# Patient Record
Sex: MALE | Race: WHITE | URBAN - METROPOLITAN AREA
[De-identification: names, ages, dates, MRNs, and addresses within clinical notes are randomized per-mention and may not be internally consistent; named-entity substitution may affect disease eponyms.]

---

## 2023-02-21 ENCOUNTER — WEB ENCOUNTER (OUTPATIENT)
Dept: URBAN - METROPOLITAN AREA CLINIC 54 | Facility: CLINIC | Age: 48
End: 2023-02-21

## 2023-03-07 ENCOUNTER — LAB OUTSIDE AN ENCOUNTER (OUTPATIENT)
Dept: URBAN - METROPOLITAN AREA CLINIC 54 | Facility: CLINIC | Age: 48
End: 2023-03-07

## 2023-03-07 ENCOUNTER — OFFICE VISIT (OUTPATIENT)
Dept: URBAN - METROPOLITAN AREA CLINIC 54 | Facility: CLINIC | Age: 48
End: 2023-03-07
Payer: COMMERCIAL

## 2023-03-07 ENCOUNTER — WEB ENCOUNTER (OUTPATIENT)
Dept: URBAN - METROPOLITAN AREA CLINIC 54 | Facility: CLINIC | Age: 48
End: 2023-03-07

## 2023-03-07 ENCOUNTER — TELEPHONE ENCOUNTER (OUTPATIENT)
Dept: URBAN - METROPOLITAN AREA CLINIC 54 | Facility: CLINIC | Age: 48
End: 2023-03-07

## 2023-03-07 VITALS
DIASTOLIC BLOOD PRESSURE: 83 MMHG | SYSTOLIC BLOOD PRESSURE: 122 MMHG | HEART RATE: 75 BPM | WEIGHT: 192 LBS | TEMPERATURE: 98.2 F | HEIGHT: 69 IN | BODY MASS INDEX: 28.44 KG/M2

## 2023-03-07 DIAGNOSIS — K62.5 RECTAL BLEEDING: ICD-10-CM

## 2023-03-07 DIAGNOSIS — R79.89 ELEVATED LFTS: ICD-10-CM

## 2023-03-07 PROCEDURE — 99244 OFF/OP CNSLTJ NEW/EST MOD 40: CPT | Performed by: STUDENT IN AN ORGANIZED HEALTH CARE EDUCATION/TRAINING PROGRAM

## 2023-03-07 PROCEDURE — 99204 OFFICE O/P NEW MOD 45 MIN: CPT | Performed by: STUDENT IN AN ORGANIZED HEALTH CARE EDUCATION/TRAINING PROGRAM

## 2023-03-07 RX ORDER — SODIUM, POTASSIUM,MAG SULFATES 17.5-3.13G
177ML SOLUTION, RECONSTITUTED, ORAL ORAL
Qty: 1 | OUTPATIENT
Start: 2023-03-08 | End: 2023-03-10

## 2023-03-07 RX ORDER — POLYETHYLENE GLYCOL-3350 AND ELECTROLYTES WITH FLAVOR PACK 240; 5.84; 2.98; 6.72; 22.72 G/278.26G; G/278.26G; G/278.26G; G/278.26G; G/278.26G
AS DIRECTED POWDER, FOR SOLUTION ORAL AS DIRECTED
Qty: 1 PACKAGE | Refills: 0 | OUTPATIENT
Start: 2023-03-07 | End: 2023-03-09

## 2023-03-07 RX ORDER — POLYETHYLENE GLYCOL-3350 AND ELECTROLYTES WITH FLAVOR PACK 240; 5.84; 2.98; 6.72; 22.72 G/278.26G; G/278.26G; G/278.26G; G/278.26G; G/278.26G
AS DIRECTED POWDER, FOR SOLUTION ORAL AS DIRECTED
Qty: 1 PACKAGE | Refills: 0 | Status: ACTIVE | COMMUNITY
Start: 2023-03-07 | End: 2023-03-09

## 2023-03-07 NOTE — HPI-TODAY'S VISIT:
Mr. Aly Nugent is a 47-year-old man with no significant past medical history referred by Jb Samaniego MD for elevated LFTs and colon cancer screening.  Labs from 1/20/2023 are significant for AST 38, ALT 51 bilirubin and alkaline phosphatase within normal limit platelets 266  Patient has a family history of colon cancer in his paternal grandmother diagnosed at unknown age.  Endorses occasional blood in stools.  He thinks he has hemorrhoids. Patient denies change in bowel habits, diarrhea, constipation, melena, abdominal pain, unintentional weight loss, history of colon polyps, family history of colorectal cancer.  Patient states that he drinks alcohol all on a regular basis.  He can drink alcohol 3 to 5 days a week.  He would typically drink up to 2 beers.  Patient denies history of diabetes, IVDU, blood product transfusions before 1992, autoimmune disease or family history of liver disease.

## 2023-03-07 NOTE — EXAM-PHYSICAL EXAM
General: Well appearing. No acute distress. HEENT: Anicteric sclerae Cardiovascular: Normal heart rate Respiratory: Breathing comfortably without conversational dyspnea Abdomen:  non-distended, soft, non-tender Skin: without visible rashes on examined areas. Musculoskeletal: ambulated without difficulty. Can stand from sitting position without assistance. Neuro: No gross focal deficits. Alert and oriented. Psych: Appropriate mood and affect.

## 2023-03-09 LAB
A/G RATIO: 1.8
ALBUMIN: 4.8
ALKALINE PHOSPHATASE: 68
ALT (SGPT): 33
AST (SGOT): 26
BILIRUBIN, TOTAL: 1.2
BUN/CREATININE RATIO: (no result)
BUN: 12
CALCIUM: 10
CARBON DIOXIDE, TOTAL: 28
CHLORIDE: 103
CREATININE: 0.76
EGFR: 112
GLOBULIN, TOTAL: 2.6
GLUCOSE: 74
HEPATITIS B SURFACE AB IMMUNITY, QN: <5
HEPATITIS B SURFACE ANTIGEN: (no result)
HEPATITIS C ANTIBODY: (no result)
POTASSIUM: 4.2
PROTEIN, TOTAL: 7.4
REFLEX TIQ: (no result)
SIGNAL TO CUT-OFF: 0.02
SODIUM: 139

## 2023-03-23 ENCOUNTER — TELEPHONE ENCOUNTER (OUTPATIENT)
Dept: URBAN - METROPOLITAN AREA CLINIC 54 | Facility: CLINIC | Age: 48
End: 2023-03-23

## 2023-03-27 ENCOUNTER — OFFICE VISIT (OUTPATIENT)
Dept: URBAN - METROPOLITAN AREA SURGERY CENTER 14 | Facility: SURGERY CENTER | Age: 48
End: 2023-03-27

## 2023-03-27 ENCOUNTER — CLAIMS CREATED FROM THE CLAIM WINDOW (OUTPATIENT)
Dept: URBAN - METROPOLITAN AREA SURGERY CENTER 14 | Facility: SURGERY CENTER | Age: 48
End: 2023-03-27
Payer: COMMERCIAL

## 2023-03-27 DIAGNOSIS — K92.1 ACUTE MELENA: ICD-10-CM

## 2023-03-27 PROBLEM — 305058001: Status: ACTIVE | Noted: 2023-03-27

## 2023-03-27 PROBLEM — 12063002: Status: ACTIVE | Noted: 2023-03-27

## 2023-03-27 PROBLEM — 863927004: Status: ACTIVE | Noted: 2023-03-27

## 2023-03-27 PROCEDURE — G8907 PT DOC NO EVENTS ON DISCHARG: HCPCS | Performed by: STUDENT IN AN ORGANIZED HEALTH CARE EDUCATION/TRAINING PROGRAM

## 2023-03-27 PROCEDURE — 45378 DIAGNOSTIC COLONOSCOPY: CPT | Performed by: STUDENT IN AN ORGANIZED HEALTH CARE EDUCATION/TRAINING PROGRAM

## 2023-04-05 ENCOUNTER — OFFICE VISIT (OUTPATIENT)
Dept: URBAN - METROPOLITAN AREA CLINIC 53 | Facility: CLINIC | Age: 48
End: 2023-04-05
Payer: COMMERCIAL

## 2023-04-05 DIAGNOSIS — K76.89 HEPATIC CYST: ICD-10-CM

## 2023-04-05 DIAGNOSIS — R79.89 ELEVATED LFTS: ICD-10-CM

## 2023-04-05 PROCEDURE — 76705 ECHO EXAM OF ABDOMEN: CPT | Performed by: STUDENT IN AN ORGANIZED HEALTH CARE EDUCATION/TRAINING PROGRAM

## 2023-04-14 ENCOUNTER — OFFICE VISIT (OUTPATIENT)
Dept: URBAN - METROPOLITAN AREA CLINIC 54 | Facility: CLINIC | Age: 48
End: 2023-04-14
Payer: COMMERCIAL

## 2023-04-14 ENCOUNTER — DASHBOARD ENCOUNTERS (OUTPATIENT)
Age: 48
End: 2023-04-14

## 2023-04-14 VITALS
TEMPERATURE: 97.4 F | SYSTOLIC BLOOD PRESSURE: 131 MMHG | HEIGHT: 69 IN | BODY MASS INDEX: 28.5 KG/M2 | HEART RATE: 66 BPM | DIASTOLIC BLOOD PRESSURE: 83 MMHG | WEIGHT: 192.4 LBS

## 2023-04-14 DIAGNOSIS — R79.89 ELEVATED LFTS: ICD-10-CM

## 2023-04-14 DIAGNOSIS — Z12.11 COLON CANCER SCREENING: ICD-10-CM

## 2023-04-14 DIAGNOSIS — K76.0 FATTY LIVER: ICD-10-CM

## 2023-04-14 DIAGNOSIS — K76.89 LIVER CYST: ICD-10-CM

## 2023-04-14 PROBLEM — 85057007: Status: ACTIVE | Noted: 2023-04-14

## 2023-04-14 PROBLEM — 197321007: Status: ACTIVE | Noted: 2023-04-14

## 2023-04-14 PROCEDURE — 99213 OFFICE O/P EST LOW 20 MIN: CPT | Performed by: STUDENT IN AN ORGANIZED HEALTH CARE EDUCATION/TRAINING PROGRAM

## 2023-04-14 NOTE — HPI-TODAY'S VISIT:
Patient presents today for follow-up after anoscopy.  Patient had colonoscopy for colorectal cancer screening.  There were no polyps found.    Patient is also been followed for elevated LFTs.  Most recent CMP drawn shows no elevations in LFTs.  He is negative for viral hepatitis as well.  Patient does endorse drinking up to several days a week.  He does state that he is gaining some weight over the years.